# Patient Record
Sex: FEMALE | URBAN - METROPOLITAN AREA
[De-identification: names, ages, dates, MRNs, and addresses within clinical notes are randomized per-mention and may not be internally consistent; named-entity substitution may affect disease eponyms.]

---

## 2018-05-21 ENCOUNTER — APPOINTMENT (RX ONLY)
Dept: URBAN - METROPOLITAN AREA CLINIC 113 | Facility: CLINIC | Age: 23
Setting detail: DERMATOLOGY
End: 2018-05-21

## 2018-05-21 DIAGNOSIS — L71.8 OTHER ROSACEA: ICD-10-CM

## 2018-05-21 PROCEDURE — 99202 OFFICE O/P NEW SF 15 MIN: CPT

## 2018-05-21 PROCEDURE — ? PRESCRIPTION

## 2018-05-21 PROCEDURE — ? TREATMENT REGIMEN

## 2018-05-21 PROCEDURE — ? COUNSELING

## 2018-05-21 RX ORDER — AZELAIC ACID 0.15 G/G
AEROSOL, FOAM TOPICAL
Qty: 1 | Refills: 2 | Status: ERX | COMMUNITY
Start: 2018-05-21

## 2018-05-21 RX ORDER — IVERMECTIN 10 MG/G
CREAM TOPICAL
Qty: 1 | Refills: 2 | Status: ERX | COMMUNITY
Start: 2018-05-21

## 2018-05-21 RX ORDER — DOXYCYCLINE HYCLATE 120 MG/1
TABLET, DELAYED RELEASE ORAL
Qty: 1 | Refills: 1 | Status: ERX | COMMUNITY
Start: 2018-05-21

## 2018-05-21 RX ADMIN — IVERMECTIN: 10 CREAM TOPICAL at 15:34

## 2018-05-21 RX ADMIN — DOXYCYCLINE HYCLATE: 120 TABLET, DELAYED RELEASE ORAL at 15:36

## 2018-05-21 RX ADMIN — AZELAIC ACID: 0.15 AEROSOL, FOAM TOPICAL at 15:35

## 2018-05-21 ASSESSMENT — LOCATION DETAILED DESCRIPTION DERM
LOCATION DETAILED: RIGHT CENTRAL MALAR CHEEK
LOCATION DETAILED: LEFT CENTRAL MALAR CHEEK

## 2018-05-21 ASSESSMENT — LOCATION SIMPLE DESCRIPTION DERM
LOCATION SIMPLE: RIGHT CHEEK
LOCATION SIMPLE: LEFT CHEEK

## 2018-05-21 ASSESSMENT — LOCATION ZONE DERM: LOCATION ZONE: FACE

## 2018-05-25 ENCOUNTER — RX ONLY (OUTPATIENT)
Age: 23
Setting detail: RX ONLY
End: 2018-05-25

## 2018-05-25 RX ORDER — METRONIDAZOLE 10 MG/60G
CREAM TOPICAL
Qty: 30 | Refills: 2 | Status: ERX | COMMUNITY
Start: 2018-05-25

## 2018-05-29 ENCOUNTER — RX ONLY (OUTPATIENT)
Age: 23
Setting detail: RX ONLY
End: 2018-05-29

## 2018-05-29 RX ORDER — METRONIDAZOLE 7.5 MG/G
CREAM TOPICAL
Qty: 1 | Refills: 1 | Status: ERX | COMMUNITY
Start: 2018-05-29

## 2019-01-31 ENCOUNTER — APPOINTMENT (RX ONLY)
Dept: URBAN - METROPOLITAN AREA CLINIC 105 | Facility: CLINIC | Age: 24
Setting detail: DERMATOLOGY
End: 2019-01-31

## 2019-01-31 DIAGNOSIS — L71.8 OTHER ROSACEA: ICD-10-CM

## 2019-01-31 PROCEDURE — ? TREATMENT REGIMEN

## 2019-01-31 PROCEDURE — ? PRESCRIPTION

## 2019-01-31 PROCEDURE — ? COUNSELING

## 2019-01-31 PROCEDURE — 99202 OFFICE O/P NEW SF 15 MIN: CPT

## 2019-01-31 RX ORDER — IVERMECTIN 10 MG/G
CREAM TOPICAL DAILY
Qty: 1 | Refills: 2 | Status: ERX | COMMUNITY
Start: 2019-01-31

## 2019-01-31 RX ADMIN — IVERMECTIN: 10 CREAM TOPICAL at 16:35

## 2019-01-31 ASSESSMENT — LOCATION DETAILED DESCRIPTION DERM
LOCATION DETAILED: RIGHT CENTRAL MALAR CHEEK
LOCATION DETAILED: LEFT CENTRAL MALAR CHEEK

## 2019-01-31 ASSESSMENT — LOCATION ZONE DERM: LOCATION ZONE: FACE

## 2019-01-31 ASSESSMENT — LOCATION SIMPLE DESCRIPTION DERM
LOCATION SIMPLE: RIGHT CHEEK
LOCATION SIMPLE: LEFT CHEEK

## 2019-01-31 NOTE — PROCEDURE: TREATMENT REGIMEN
Detail Level: Zone
Initiate Treatment: Azelaic acid from [email or web address] [facility] [facility] to affected areas

## 2019-12-14 NOTE — HPI: RASH
How Severe Is Your Rash?: moderate
Is This A New Presentation, Or A Follow-Up?: Rash
Additional History: Worse with heat and alcohol.
Fall with Harm Risk

## 2022-10-10 NOTE — PROCEDURE: MIPS QUALITY
Quality 431: Preventive Care And Screening: Unhealthy Alcohol Use - Screening: Patient screened for unhealthy alcohol use using a single question and scores less than 2 times per year
Quality 110: Preventive Care And Screening: Influenza Immunization: Influenza Immunization Administered during Influenza season
Quality 226: Preventive Care And Screening: Tobacco Use: Screening And Cessation Intervention: Patient screened for tobacco use and is an ex/non-smoker
Detail Level: Detailed
No